# Patient Record
Sex: MALE | Race: WHITE | NOT HISPANIC OR LATINO | URBAN - METROPOLITAN AREA
[De-identification: names, ages, dates, MRNs, and addresses within clinical notes are randomized per-mention and may not be internally consistent; named-entity substitution may affect disease eponyms.]

---

## 2019-09-10 ENCOUNTER — EMERGENCY (EMERGENCY)
Facility: HOSPITAL | Age: 29
LOS: 1 days | Discharge: HOME | End: 2019-09-10
Admitting: EMERGENCY MEDICINE
Payer: COMMERCIAL

## 2019-09-10 VITALS
DIASTOLIC BLOOD PRESSURE: 84 MMHG | RESPIRATION RATE: 18 BRPM | OXYGEN SATURATION: 97 % | HEART RATE: 89 BPM | TEMPERATURE: 98 F | SYSTOLIC BLOOD PRESSURE: 129 MMHG

## 2019-09-10 DIAGNOSIS — Z04.1 ENCOUNTER FOR EXAMINATION AND OBSERVATION FOLLOWING TRANSPORT ACCIDENT: ICD-10-CM

## 2019-09-10 PROCEDURE — 99283 EMERGENCY DEPT VISIT LOW MDM: CPT
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
## 2019-09-10 NOTE — ED PROVIDER NOTE - PATIENT PORTAL LINK FT
You can access the FollowMyHealth Patient Portal offered by [facility] by registering at the following website: [email or web address]. By joining [facility]’s FollowMyHealth portal, you will also be able to view your health information using other applications (apps) compatible with our system.

## 2019-09-10 NOTE — ED PROVIDER NOTE - PHYSICAL EXAMINATION
General: Patient is well developed and well nourised. Patient is alert and oriented to person, place and date. Patient is laying comfortably in stretcher and appears in no acute distress.  HEENT: Head is normocephalic and atraumatic. Pupils are equal, round and reactive. Extraocular movements intact. No evidence of nystagmus, conjunctival injection, or scleral icterus. External ears symmetric without evidence of discharge.  Nose is symmetric, non-tender, patent without evidence of discharge. Teeth in good repair. Uvula midline.   Neck: Supple with no evidence of lymphadenopathy.  Full range of motion.  Lungs: Equal chest expansion. No note of retractions.  Abdomen: Soft, non-tender, non-distended without signs of masses, rebound or guarding.  Musculoskeletal: no spinal ttp. No edema, erythema, ecchymosis, atrophy or deformity. Full range of motion in all four extremities.  No clubbing or cyanosis. No point tenderness to palpation.   Neuro: Cranial nerves II-XII intact. GCS 15. Moving all extremities without discomfort. Strength is 5/5 arms and legs bilaterally. Sensation intact in all four extremities. gait steady   Skin: Warm, dry and intact without evidence of rashes, bruising, pallor, jaundice or cyanosis.   Psych: Mood and affect appropriate.